# Patient Record
Sex: MALE | Race: WHITE | NOT HISPANIC OR LATINO | Employment: FULL TIME | URBAN - METROPOLITAN AREA
[De-identification: names, ages, dates, MRNs, and addresses within clinical notes are randomized per-mention and may not be internally consistent; named-entity substitution may affect disease eponyms.]

---

## 2024-02-20 DIAGNOSIS — I10 ESSENTIAL (PRIMARY) HYPERTENSION: ICD-10-CM

## 2024-02-21 RX ORDER — LISINOPRIL 10 MG/1
10 TABLET ORAL DAILY
Qty: 90 TABLET | Refills: 0 | Status: SHIPPED | OUTPATIENT
Start: 2024-02-21 | End: 2024-04-04 | Stop reason: SDUPTHER

## 2024-03-14 ENCOUNTER — TELEPHONE (OUTPATIENT)
Dept: PRIMARY CARE | Facility: CLINIC | Age: 74
End: 2024-03-14

## 2024-04-04 ENCOUNTER — OFFICE VISIT (OUTPATIENT)
Dept: PRIMARY CARE | Facility: CLINIC | Age: 74
End: 2024-04-04
Payer: COMMERCIAL

## 2024-04-04 VITALS
BODY MASS INDEX: 29.98 KG/M2 | TEMPERATURE: 96.9 F | HEART RATE: 67 BPM | OXYGEN SATURATION: 95 % | DIASTOLIC BLOOD PRESSURE: 78 MMHG | WEIGHT: 233.6 LBS | SYSTOLIC BLOOD PRESSURE: 128 MMHG | HEIGHT: 74 IN

## 2024-04-04 DIAGNOSIS — E11.42 TYPE 2 DIABETES MELLITUS WITH DIABETIC POLYNEUROPATHY, WITHOUT LONG-TERM CURRENT USE OF INSULIN (MULTI): Primary | ICD-10-CM

## 2024-04-04 DIAGNOSIS — E78.2 COMBINED HYPERLIPIDEMIA: ICD-10-CM

## 2024-04-04 DIAGNOSIS — R53.82 CHRONIC FATIGUE: ICD-10-CM

## 2024-04-04 DIAGNOSIS — I10 ESSENTIAL (PRIMARY) HYPERTENSION: ICD-10-CM

## 2024-04-04 DIAGNOSIS — G40.909 SEIZURE DISORDER (MULTI): ICD-10-CM

## 2024-04-04 DIAGNOSIS — I10 ESSENTIAL HYPERTENSION: ICD-10-CM

## 2024-04-04 DIAGNOSIS — Z00.00 ROUTINE HEALTH MAINTENANCE: ICD-10-CM

## 2024-04-04 DIAGNOSIS — Z12.5 PROSTATE CANCER SCREENING: ICD-10-CM

## 2024-04-04 PROCEDURE — 1036F TOBACCO NON-USER: CPT | Performed by: FAMILY MEDICINE

## 2024-04-04 PROCEDURE — 1125F AMNT PAIN NOTED PAIN PRSNT: CPT | Performed by: FAMILY MEDICINE

## 2024-04-04 PROCEDURE — 4010F ACE/ARB THERAPY RXD/TAKEN: CPT | Performed by: FAMILY MEDICINE

## 2024-04-04 PROCEDURE — 3078F DIAST BP <80 MM HG: CPT | Performed by: FAMILY MEDICINE

## 2024-04-04 PROCEDURE — 1159F MED LIST DOCD IN RCRD: CPT | Performed by: FAMILY MEDICINE

## 2024-04-04 PROCEDURE — 1160F RVW MEDS BY RX/DR IN RCRD: CPT | Performed by: FAMILY MEDICINE

## 2024-04-04 PROCEDURE — 99214 OFFICE O/P EST MOD 30 MIN: CPT | Performed by: FAMILY MEDICINE

## 2024-04-04 PROCEDURE — 3074F SYST BP LT 130 MM HG: CPT | Performed by: FAMILY MEDICINE

## 2024-04-04 RX ORDER — CARBAMAZEPINE 200 MG/1
200 TABLET ORAL 2 TIMES DAILY
COMMUNITY
Start: 2021-02-08

## 2024-04-04 RX ORDER — PHENYTOIN SODIUM 100 MG/1
200 CAPSULE, EXTENDED RELEASE ORAL 2 TIMES DAILY
COMMUNITY
Start: 2021-01-04

## 2024-04-04 RX ORDER — LISINOPRIL 10 MG/1
10 TABLET ORAL DAILY
Qty: 90 TABLET | Refills: 1 | Status: SHIPPED | OUTPATIENT
Start: 2024-04-04 | End: 2024-10-01

## 2024-04-04 ASSESSMENT — PAIN SCALES - GENERAL: PAINLEVEL: 2

## 2024-04-04 ASSESSMENT — PATIENT HEALTH QUESTIONNAIRE - PHQ9
2. FEELING DOWN, DEPRESSED OR HOPELESS: NOT AT ALL
SUM OF ALL RESPONSES TO PHQ9 QUESTIONS 1 AND 2: 0
1. LITTLE INTEREST OR PLEASURE IN DOING THINGS: NOT AT ALL

## 2024-04-04 ASSESSMENT — ENCOUNTER SYMPTOMS
OCCASIONAL FEELINGS OF UNSTEADINESS: 0
DEPRESSION: 0
LOSS OF SENSATION IN FEET: 0

## 2024-04-04 ASSESSMENT — LIFESTYLE VARIABLES: HOW MANY STANDARD DRINKS CONTAINING ALCOHOL DO YOU HAVE ON A TYPICAL DAY: PATIENT DOES NOT DRINK

## 2024-04-04 NOTE — PATIENT INSTRUCTIONS
Problem List Items Addressed This Visit             ICD-10-CM    Type 2 diabetes mellitus with diabetic polyneuropathy, without long-term current use of insulin (CMS/[facility]) - Primary E11.42    Relevant Orders    Comprehensive Metabolic Panel    Lipid Panel    CBC    TSH with reflex to Free T4 if abnormal    Albumin , Urine Random    Hemoglobin A1c    Insulin, Fasting    C-Peptide    Essential hypertension I10    Relevant Orders    Comprehensive Metabolic Panel    Lipid Panel    CBC    TSH with reflex to Free T4 if abnormal    Combined hyperlipidemia E78.2    Relevant Orders    Comprehensive Metabolic Panel    Lipid Panel    CBC    TSH with reflex to Free T4 if abnormal    Seizure disorder (CMS/[facility]) G40.909     Other Visit Diagnoses         Codes    Routine health maintenance     Z00.00    Relevant Orders    Comprehensive Metabolic Panel    Lipid Panel    CBC    TSH with reflex to Free T4 if abnormal    Hemoglobin A1c    Vitamin D 25-Hydroxy,Total (for eval of Vitamin D levels)    Insulin, Fasting    C-Peptide    Essential (primary) hypertension     I10    Relevant Medications    lisinopril 10 mg tablet    Chronic fatigue     R53.82    Relevant Orders    Vitamin D 25-Hydroxy,Total (for eval of Vitamin D levels)            Additional Visit Plans:  We addressed your medication needs today, please reschedule your Medicare wellness visit for another time.    Due for diabetic eye exam at this time, please be sure to have this done yearly.  Foot exam completed today.    Please have routine blood work done so I can assess management of your chronic conditions and if any medication dose adjustments are needed.    Follow-up every 6 months for medication checkup and routine blood work.  Follow-up once a year separately for Medicare wellness visit.    Guidelines now recommend that all diabetics be on statin therapy to help reduce risk of heart attack and stroke.  This is even if your cholesterol levels are perfect.  Your risk of  a serious cardiovascular event such as a heart attack or stroke in the next 10 years is elevated at 39% . Normally we start statin therapy if your risk is 5% or higher. Declining statin therapy for personal reasons.     Use some of juanito's nystatin powder between the toes, Wash with Tea tree oil bodywash in the area and dry well.       Next Wellness Exam/Annual Physical Due  At your earliest convenience    Patient Care Team:  Neeraj Mcgovern DO as PCP - General (Family Medicine)  Neeraj Mcgovern DO as PCP - [facility] PCP  Neeraj Mcgovern DO (Family Medicine)    Neeraj Mcgovern DO   04/04/24   9:41 AM

## 2024-04-04 NOTE — PROGRESS NOTES
"         Outpatient Visit Note    Chief Complaint   Patient presents with    Follow-up     Medication followup       HPI:  Esa Patricia is a 73 y.o. male here  for Medicare wellness visit but is well overdue for medication follow-up and needs refills, will reschedule Medicare wellness visit for another time.  Plan to do medication refills today.    Of note he is personally choosing to continue a carnivore diet where he eats protein/meat only (despite medical recommendations to discontinue this in pursuit of a well-balanced diet as we are starting to see effects on his blood work). He has been open to eating a salad every once in a while or keto brownies. He had a lipid panel checked with hepatic function panel in May 2023.    He continues to see neurology for his seizure follow-ups, no recent medication changes or seizures.    He has type 2 diabetes which is managed through diet and exercise, with diabetic polyneuropathy. Declining IH A1c today.     He is on 10 mg of lisinopril daily for renal protection and for his blood pressure.  Does measure blood pressure at home rarely, shows me recorded number of 127/72.  He denies any headache, blurry vision, nosebleeds, chest pain, shortness a breath, dizziness or lower extremity edema. No side effects from the medicine, no cough. He did not come in for nurse visit to check the calibration on his blood pressure machine as previously recommended.    He stopped his 80 mg of atorvastatin on his own with going on the carnivore diet.  I have recommended tight LDL control with his diabetes. He is still declining any cholesterol medicine at this time. ASCVD risk is 39%. He says \"statins will kill you and I have read all the studies.\"    He did not have a follow-up with nutritional counselors related to [employer] testing for balancing of his gut bacteria. He did ultimately see the GI doctor for evaluation, had colonscopy. Had polypectomy, benign. Return in 3-5 years. GI told him " he could use immodium as needed which he does. He is happy with this regimen.     He is asking fasting insulin and c-peptide testing today, willing to pay out of pocket for this. Wants Vit D checked, never checked before.     PHQ9/GAD7:         Past Medical History:   Diagnosis Date    Diabetes mellitus (CMS/HCC)     Epilepsy (CMS/HCC)     Hypertension         Current Medications  Current Outpatient Medications   Medication Instructions    carBAMazepine (TEGRETOL) 200 mg, oral, 2 times daily    flash glucose sensor (FREESTYLE ALBERTO 2 SENSOR [facility]) Every 24 hours    lisinopril 10 mg, oral, Daily    phenytoin ER (DILANTIN) 200 mg, oral, 2 times daily        Allergies  Allergies   Allergen Reactions    Pollen Extracts Cough    Scallops Nausea/vomiting        Past Surgical History:   Procedure Laterality Date    FINGER SURGERY      VASECTOMY       No family history on file.  Social History     Tobacco Use    Smoking status: Never    Smokeless tobacco: Never   Vaping Use    Vaping Use: Never used   Substance Use Topics    Alcohol use: Not Currently    Drug use: Never     Tobacco Use: Low Risk  (4/4/2024)    Patient History     Smoking Tobacco Use: Never     Smokeless Tobacco Use: Never     Passive Exposure: Not on file        ROS  All pertinent positive symptoms are included in the history of present illness.  All other systems have been reviewed and are negative and noncontributory to this patient's current ailments.    VITAL SIGNS  Vitals:    04/04/24 0924   BP: 128/78   Pulse: 67   Temp: 36.1 °C (96.9 °F)   SpO2: 95%     Vitals:    04/04/24 0924   Weight: 106 kg (233 lb 9.6 oz)      Body mass index is 29.99 kg/m².     PHYSICAL EXAM  GENERAL APPEARANCE: well nourished, well developed, looks like stated age, in no acute distress, not ill or tired appearing, conversing well.   HEENT: no trauma, normocephalic.   NECK: no nodes, supple without rigidity, no neck mass was observed,  no thyromegaly.   HEART: regular rate and  rhythm, S1 and S2 heard with no murmurs or skipped beats. No carotid bruits.  LUNGS: clear to auscultation bilaterally with no wheezes, crackles or rales.   ABDOMEN: no organomegaly, soft, nontender, nondistended, normal bowel sounds, no guarding/rebound/rigidity.   EXTREMITIES: moving all extremities equally with no edema or deformities.   SKIN: normal skin color and pigmentation, normal skin turgor  NEUROLOGIC EXAM: CN II-XII grossly intact, normal gait, normal balance.   PSYCH: mood and affect appropriate; alert and oriented to time, place, person; no difficulty with speech or language.     DIABETIC FOOT EXAM PERFORMED TODAY:  Sensory testing performed:  sensations diminished: Pinprick and vibratory sensations diminshed bilaterally  Sensory and motor testing performed:  strength normal  Pedal pulse taking performed:  2+ PT and DP pulses bilaterally. Lower extremities warm to touch. Capillary reflex <3 seconds bilaterally  Visual exam of foot performed:  Yes No hair loss. No ulcers, some fungal thickening of skin  Footwear Evaluation performed:  Yes           Assessment/Plan   Problem List Items Addressed This Visit             ICD-10-CM    Type 2 diabetes mellitus with diabetic polyneuropathy, without long-term current use of insulin (CMS/[facility]) - Primary E11.42    Relevant Orders    Comprehensive Metabolic Panel    Lipid Panel    CBC    TSH with reflex to Free T4 if abnormal    Albumin , Urine Random    Hemoglobin A1c    Insulin, Fasting    C-Peptide    CT cardiac scoring wo IV contrast    Essential hypertension I10    Relevant Orders    Comprehensive Metabolic Panel    Lipid Panel    CBC    TSH with reflex to Free T4 if abnormal    CT cardiac scoring wo IV contrast    Combined hyperlipidemia E78.2    Relevant Orders    Comprehensive Metabolic Panel    Lipid Panel    CBC    TSH with reflex to Free T4 if abnormal    CT cardiac scoring wo IV contrast    Seizure disorder (CMS/[facility]) G40.909     Other Visit Diagnoses          Codes    Routine health maintenance     Z00.00    Relevant Orders    Comprehensive Metabolic Panel    Lipid Panel    CBC    TSH with reflex to Free T4 if abnormal    Hemoglobin A1c    Vitamin D 25-Hydroxy,Total (for eval of Vitamin D levels)    Insulin, Fasting    C-Peptide    Prostate Spec.Ag,Screen    Essential (primary) hypertension     I10    Relevant Medications    lisinopril 10 mg tablet    Chronic fatigue     R53.82    Relevant Orders    Vitamin D 25-Hydroxy,Total (for eval of Vitamin D levels)    Prostate cancer screening     Z12.5    Relevant Orders    Prostate Spec.Ag,Screen            Additional Visit Plans:  We addressed your medication needs today, please reschedule your Medicare wellness visit for another time.    Due for diabetic eye exam at this time, please be sure to have this done yearly.  Foot exam completed today.    Please have routine blood work done so I can assess management of your chronic conditions and if any medication dose adjustments are needed.    Follow-up every 6 months for medication checkup and routine blood work.  Follow-up once a year separately for Medicare wellness visit.    Guidelines now recommend that all diabetics be on statin therapy to help reduce risk of heart attack and stroke.  This is even if your cholesterol levels are perfect.  Your risk of a serious cardiovascular event such as a heart attack or stroke in the next 10 years is elevated at 39% . Normally we start statin therapy if your risk is 5% or higher. Declining statin therapy for personal reasons.     Use some of juanito's nystatin powder between the toes, Wash with Tea tree oil bodywash in the area and dry well.       Next Wellness Exam/Annual Physical Due  At your earliest convenience    Patient Care Team:  Neeraj Mcgovern DO as PCP - General (Family Medicine)  Neeraj Mcgovern DO as PCP - [facility] PCP  Neeraj Mcgovern DO (Family Medicine)    Neeraj Mcgovern DO   04/04/24   10:06 AM

## 2024-04-08 ENCOUNTER — LAB (OUTPATIENT)
Dept: LAB | Facility: LAB | Age: 74
End: 2024-04-08
Payer: COMMERCIAL

## 2024-04-08 DIAGNOSIS — I10 ESSENTIAL HYPERTENSION: ICD-10-CM

## 2024-04-08 DIAGNOSIS — E78.2 COMBINED HYPERLIPIDEMIA: ICD-10-CM

## 2024-04-08 DIAGNOSIS — Z12.5 PROSTATE CANCER SCREENING: ICD-10-CM

## 2024-04-08 DIAGNOSIS — E11.42 TYPE 2 DIABETES MELLITUS WITH DIABETIC POLYNEUROPATHY, WITHOUT LONG-TERM CURRENT USE OF INSULIN (MULTI): ICD-10-CM

## 2024-04-08 DIAGNOSIS — Z00.00 ROUTINE HEALTH MAINTENANCE: ICD-10-CM

## 2024-04-08 DIAGNOSIS — R53.82 CHRONIC FATIGUE: ICD-10-CM

## 2024-04-08 LAB
25(OH)D3 SERPL-MCNC: 32 NG/ML (ref 30–100)
ALBUMIN SERPL BCP-MCNC: 4.9 G/DL (ref 3.4–5)
ALP SERPL-CCNC: 86 U/L (ref 33–136)
ALT SERPL W P-5'-P-CCNC: 26 U/L (ref 10–52)
ANION GAP SERPL CALC-SCNC: 15 MMOL/L (ref 10–20)
AST SERPL W P-5'-P-CCNC: 20 U/L (ref 9–39)
BILIRUB SERPL-MCNC: 0.4 MG/DL (ref 0–1.2)
BUN SERPL-MCNC: 22 MG/DL (ref 6–23)
C PEPTIDE SERPL-MCNC: 2.3 NG/ML (ref 0.7–3.9)
CALCIUM SERPL-MCNC: 9.8 MG/DL (ref 8.6–10.6)
CHLORIDE SERPL-SCNC: 102 MMOL/L (ref 98–107)
CHOLEST SERPL-MCNC: 235 MG/DL (ref 0–199)
CHOLESTEROL/HDL RATIO: 4.3
CO2 SERPL-SCNC: 26 MMOL/L (ref 21–32)
CREAT SERPL-MCNC: 0.87 MG/DL (ref 0.5–1.3)
EGFRCR SERPLBLD CKD-EPI 2021: >90 ML/MIN/1.73M*2
ERYTHROCYTE [DISTWIDTH] IN BLOOD BY AUTOMATED COUNT: 13.3 % (ref 11.5–14.5)
EST. AVERAGE GLUCOSE BLD GHB EST-MCNC: 111 MG/DL
GLUCOSE SERPL-MCNC: 117 MG/DL (ref 74–99)
HBA1C MFR BLD: 5.5 %
HCT VFR BLD AUTO: 46.2 % (ref 41–52)
HDLC SERPL-MCNC: 54.4 MG/DL
HGB BLD-MCNC: 15.3 G/DL (ref 13.5–17.5)
INSULIN P FAST SERPL-ACNC: 11 UIU/ML (ref 3–25)
LDLC SERPL CALC-MCNC: 161 MG/DL
MCH RBC QN AUTO: 30.3 PG (ref 26–34)
MCHC RBC AUTO-ENTMCNC: 33.1 G/DL (ref 32–36)
MCV RBC AUTO: 92 FL (ref 80–100)
NON HDL CHOLESTEROL: 181 MG/DL (ref 0–149)
NRBC BLD-RTO: 0 /100 WBCS (ref 0–0)
PLATELET # BLD AUTO: 220 X10*3/UL (ref 150–450)
POTASSIUM SERPL-SCNC: 4.3 MMOL/L (ref 3.5–5.3)
PROT SERPL-MCNC: 7.5 G/DL (ref 6.4–8.2)
PSA SERPL-MCNC: 1.02 NG/ML
RBC # BLD AUTO: 5.05 X10*6/UL (ref 4.5–5.9)
SODIUM SERPL-SCNC: 139 MMOL/L (ref 136–145)
TRIGL SERPL-MCNC: 100 MG/DL (ref 0–149)
TSH SERPL-ACNC: 2.09 MIU/L (ref 0.44–3.98)
VLDL: 20 MG/DL (ref 0–40)
WBC # BLD AUTO: 6.7 X10*3/UL (ref 4.4–11.3)

## 2024-04-08 PROCEDURE — 36415 COLL VENOUS BLD VENIPUNCTURE: CPT

## 2024-04-08 PROCEDURE — 83036 HEMOGLOBIN GLYCOSYLATED A1C: CPT

## 2024-04-08 PROCEDURE — 85027 COMPLETE CBC AUTOMATED: CPT

## 2024-04-08 PROCEDURE — 82306 VITAMIN D 25 HYDROXY: CPT

## 2024-04-08 PROCEDURE — 80061 LIPID PANEL: CPT

## 2024-04-08 PROCEDURE — 83525 ASSAY OF INSULIN: CPT

## 2024-04-08 PROCEDURE — 84153 ASSAY OF PSA TOTAL: CPT

## 2024-04-08 PROCEDURE — 84681 ASSAY OF C-PEPTIDE: CPT

## 2024-04-08 PROCEDURE — 84443 ASSAY THYROID STIM HORMONE: CPT

## 2024-04-08 PROCEDURE — 80053 COMPREHEN METABOLIC PANEL: CPT

## 2024-05-30 ENCOUNTER — HOSPITAL ENCOUNTER (OUTPATIENT)
Dept: RADIOLOGY | Facility: HOSPITAL | Age: 74
End: 2024-05-30
Payer: COMMERCIAL

## 2024-08-09 ENCOUNTER — HOSPITAL ENCOUNTER (OUTPATIENT)
Dept: RADIOLOGY | Facility: HOSPITAL | Age: 74
Discharge: HOME | End: 2024-08-09
Payer: COMMERCIAL

## 2024-08-09 DIAGNOSIS — E78.2 COMBINED HYPERLIPIDEMIA: ICD-10-CM

## 2024-08-09 DIAGNOSIS — E11.42 TYPE 2 DIABETES MELLITUS WITH DIABETIC POLYNEUROPATHY, WITHOUT LONG-TERM CURRENT USE OF INSULIN (MULTI): ICD-10-CM

## 2024-08-09 DIAGNOSIS — I10 ESSENTIAL HYPERTENSION: ICD-10-CM

## 2024-08-09 PROCEDURE — 75571 CT HRT W/O DYE W/CA TEST: CPT

## 2025-01-24 ENCOUNTER — APPOINTMENT (OUTPATIENT)
Dept: PRIMARY CARE | Facility: CLINIC | Age: 75
End: 2025-01-24
Payer: COMMERCIAL

## 2025-03-18 ENCOUNTER — TELEPHONE (OUTPATIENT)
Dept: PRIMARY CARE | Facility: CLINIC | Age: 75
End: 2025-03-18
Payer: COMMERCIAL

## 2025-03-18 DIAGNOSIS — I10 ESSENTIAL (PRIMARY) HYPERTENSION: ICD-10-CM

## 2025-03-18 NOTE — TELEPHONE ENCOUNTER
Med refill         lisinopril 10 mg tablet TAKE 1 TABLET BY MOUTH EVERY DAY   Number of times this order has been changed since signin        Nikolai in mentor for the pharmacy

## 2025-03-20 RX ORDER — LISINOPRIL 10 MG/1
10 TABLET ORAL DAILY
Qty: 90 TABLET | Refills: 1 | Status: SHIPPED | OUTPATIENT
Start: 2025-03-20

## 2025-03-24 ENCOUNTER — OFFICE VISIT (OUTPATIENT)
Dept: URGENT CARE | Age: 75
End: 2025-03-24
Payer: COMMERCIAL

## 2025-03-24 VITALS
WEIGHT: 250 LBS | SYSTOLIC BLOOD PRESSURE: 120 MMHG | RESPIRATION RATE: 19 BRPM | DIASTOLIC BLOOD PRESSURE: 72 MMHG | OXYGEN SATURATION: 95 % | TEMPERATURE: 98.4 F | HEIGHT: 74 IN | BODY MASS INDEX: 32.08 KG/M2 | HEART RATE: 93 BPM

## 2025-03-24 DIAGNOSIS — R53.83 FATIGUE, UNSPECIFIED TYPE: Primary | ICD-10-CM

## 2025-03-24 LAB
POC RAPID INFLUENZA A: NEGATIVE
POC RAPID INFLUENZA B: NEGATIVE
POC SARS-COV-2 AG BINAX: NORMAL

## 2025-03-24 PROCEDURE — 1036F TOBACCO NON-USER: CPT | Performed by: STUDENT IN AN ORGANIZED HEALTH CARE EDUCATION/TRAINING PROGRAM

## 2025-03-24 PROCEDURE — 99214 OFFICE O/P EST MOD 30 MIN: CPT | Performed by: STUDENT IN AN ORGANIZED HEALTH CARE EDUCATION/TRAINING PROGRAM

## 2025-03-24 PROCEDURE — 3074F SYST BP LT 130 MM HG: CPT | Performed by: STUDENT IN AN ORGANIZED HEALTH CARE EDUCATION/TRAINING PROGRAM

## 2025-03-24 PROCEDURE — 3078F DIAST BP <80 MM HG: CPT | Performed by: STUDENT IN AN ORGANIZED HEALTH CARE EDUCATION/TRAINING PROGRAM

## 2025-03-24 PROCEDURE — 87811 SARS-COV-2 COVID19 W/OPTIC: CPT | Performed by: STUDENT IN AN ORGANIZED HEALTH CARE EDUCATION/TRAINING PROGRAM

## 2025-03-24 PROCEDURE — 3008F BODY MASS INDEX DOCD: CPT | Performed by: STUDENT IN AN ORGANIZED HEALTH CARE EDUCATION/TRAINING PROGRAM

## 2025-03-24 PROCEDURE — 87804 INFLUENZA ASSAY W/OPTIC: CPT | Performed by: STUDENT IN AN ORGANIZED HEALTH CARE EDUCATION/TRAINING PROGRAM

## 2025-03-24 PROCEDURE — 4010F ACE/ARB THERAPY RXD/TAKEN: CPT | Performed by: STUDENT IN AN ORGANIZED HEALTH CARE EDUCATION/TRAINING PROGRAM

## 2025-03-24 PROCEDURE — 1159F MED LIST DOCD IN RCRD: CPT | Performed by: STUDENT IN AN ORGANIZED HEALTH CARE EDUCATION/TRAINING PROGRAM

## 2025-03-24 PROCEDURE — 93000 ELECTROCARDIOGRAM COMPLETE: CPT | Performed by: STUDENT IN AN ORGANIZED HEALTH CARE EDUCATION/TRAINING PROGRAM

## 2025-03-24 ASSESSMENT — ENCOUNTER SYMPTOMS
DIZZINESS: 1
FATIGUE: 1

## 2025-03-24 NOTE — PROGRESS NOTES
"Subjective   Patient ID: Esa Patricia \"Lloyd\" is a 74 y.o. male. They present today with a chief complaint of Other (Low BP fast pulse ).    History of Present Illness  Pt presents with fatigue. Started about 3 days ago. States just feeling more tired than normal. No known sick contacts. Having some intermittent dizziness with ambulation as well for 6 months, states sometimes has to hold onto walls. Denies fever chills uri sx/cough abd pain urinary sx nvd back pain syncope leg swelling orthopnea brown chest pain shortness of breath. Pmhx of TIIDM, HTN, HLD, epilepsy. Eating and drinking well. Wife took vitals this morning, BP Was 106/64 and HR 93. Pt states lower than normal BP.      History provided by:  Patient and spouse      Past Medical History  Allergies as of 03/24/2025 - Reviewed 03/24/2025   Allergen Reaction Noted    Pollen extracts Cough 10/06/2017    Scallops Nausea/vomiting 12/31/2009       (Not in a hospital admission)       Past Medical History:   Diagnosis Date    Diabetes mellitus (Multi)     Epilepsy     Hypertension        Past Surgical History:   Procedure Laterality Date    FINGER SURGERY      VASECTOMY          reports that he has never smoked. He has never used smokeless tobacco. He reports that he does not currently use alcohol. He reports that he does not use drugs.    Review of Systems  Review of Systems   Constitutional:  Positive for fatigue.   Neurological:  Positive for dizziness.   All other systems reviewed and are negative.                                 Objective    Vitals:    03/24/25 1344   BP: 120/72   BP Location: Left arm   Patient Position: Sitting   BP Cuff Size: Small adult   Pulse: 93   Resp: 19   Temp: 36.9 °C (98.4 °F)   TempSrc: Oral   SpO2: 95%   Weight: 113 kg (250 lb)   Height: 1.88 m (6' 2\")     No LMP for male patient.    Physical Exam  Vitals and nursing note reviewed.   Constitutional:       General: He is not in acute distress.     Appearance: Normal " appearance. He is not ill-appearing, toxic-appearing or diaphoretic.   HENT:      Head: Normocephalic and atraumatic.      Right Ear: Tympanic membrane, ear canal and external ear normal.      Left Ear: Tympanic membrane, ear canal and external ear normal.      Mouth/Throat:      Mouth: Mucous membranes are moist.   Eyes:      Extraocular Movements: Extraocular movements intact.      Pupils: Pupils are equal, round, and reactive to light.   Cardiovascular:      Rate and Rhythm: Normal rate and regular rhythm.      Pulses: Normal pulses.      Heart sounds: No murmur heard.  Pulmonary:      Effort: Pulmonary effort is normal. No respiratory distress.      Breath sounds: No wheezing, rhonchi or rales.   Musculoskeletal:      Right lower leg: No edema.      Left lower leg: No edema.   Neurological:      General: No focal deficit present.      Mental Status: He is alert.         Procedures    Point of Care Test & Imaging Results from this visit  Results for orders placed or performed in visit on 03/24/25   POCT Influenza A/B manually resulted   Result Value Ref Range    POC Rapid Influenza A Negative Negative    POC Rapid Influenza B Negative Negative   POCT Covid-19 Rapid Antigen   Result Value Ref Range    POC DOMINIC-COV-2 AG  Presumptive negative test for SARS-CoV-2 (no antigen detected)     Presumptive negative test for SARS-CoV-2 (no antigen detected)      ECG 12 Lead    Result Date: 3/24/2025  EKG nsr hr 81, L axis deviation poss LAFB. No prior EKG in chart to compare.      Diagnostic study results (if any) were reviewed by La Nathan PA-C.    Assessment/Plan   Allergies, medications, history, and pertinent labs/EKGs/Imaging reviewed by La Nathan PA-C.     Medical Decision Making      Due to the medical service (s) limitations of the Urgent care, the Patient is being recommended for a higher level of medical evaluation in the emergency department. Provider has discussed the differential diagnoses and  reasons for a higher level of evaluation due to the possibility of needing radiology testing, blood work, cardiac testing, and or IV fluids  as some examples. Patient Verbalizes Understanding and is agreeable to the plan. Patient stated will go to ER at [place]. Patient taken by wife via Car. Declined EMS. Patient is alert and oriented X 3, steady gait, ABCs intact, no acute distress is noted. Advised of importance of evaluation and risks of not seeking further evaluation. Case D/w supervising Dr. Lopez.    Orders and Diagnoses  Diagnoses and all orders for this visit:  Fatigue, unspecified type  -     POCT Influenza A/B manually resulted  -     POCT Covid-19 Rapid Antigen  -     ECG 12 Lead      Medical Admin Record      Patient disposition: ED    Electronically signed by La Nathan PA-C  3:54 PM

## 2025-03-24 NOTE — PATIENT INSTRUCTIONS
Please proceed to nearest emergency room for further evaluation:    [facility]   [address], [place] [address] [phone]

## 2025-03-31 ENCOUNTER — TELEPHONE (OUTPATIENT)
Dept: PRIMARY CARE | Facility: CLINIC | Age: 75
End: 2025-03-31
Payer: COMMERCIAL

## 2025-03-31 DIAGNOSIS — I10 ESSENTIAL (PRIMARY) HYPERTENSION: ICD-10-CM

## 2025-03-31 RX ORDER — LISINOPRIL 10 MG/1
10 TABLET ORAL DAILY
Qty: 90 TABLET | Refills: 1 | OUTPATIENT
Start: 2025-03-31

## 2025-03-31 NOTE — TELEPHONE ENCOUNTER
MED REFILL    Dispense Quantity: 90 tablet     [id number] - [place], [place] - [address] [[id number]]     lisinopril 10 mg tablet Take 1 tablet (10 mg) by mouth once daily.   Number of times this order has been changed since signin

## 2025-07-20 ENCOUNTER — APPOINTMENT (OUTPATIENT)
Dept: URGENT CARE | Age: 75
End: 2025-07-20